# Patient Record
Sex: MALE | Race: WHITE | Employment: UNEMPLOYED | URBAN - METROPOLITAN AREA
[De-identification: names, ages, dates, MRNs, and addresses within clinical notes are randomized per-mention and may not be internally consistent; named-entity substitution may affect disease eponyms.]

---

## 2017-05-24 ENCOUNTER — OFFICE VISIT (OUTPATIENT)
Dept: FAMILY MEDICINE | Facility: CLINIC | Age: 22
End: 2017-05-24
Payer: COMMERCIAL

## 2017-05-24 VITALS
OXYGEN SATURATION: 98 % | DIASTOLIC BLOOD PRESSURE: 70 MMHG | TEMPERATURE: 97.5 F | SYSTOLIC BLOOD PRESSURE: 124 MMHG | HEART RATE: 82 BPM | RESPIRATION RATE: 19 BRPM | WEIGHT: 151 LBS | HEIGHT: 71 IN | BODY MASS INDEX: 21.14 KG/M2

## 2017-05-24 DIAGNOSIS — Z01.818 PREOP GENERAL PHYSICAL EXAM: Primary | ICD-10-CM

## 2017-05-24 LAB
ERYTHROCYTE [DISTWIDTH] IN BLOOD BY AUTOMATED COUNT: 12.3 % (ref 10–15)
HCT VFR BLD AUTO: 44.7 % (ref 40–53)
HGB BLD-MCNC: 15.3 G/DL (ref 13.3–17.7)
MCH RBC QN AUTO: 29.8 PG (ref 26.5–33)
MCHC RBC AUTO-ENTMCNC: 34.2 G/DL (ref 31.5–36.5)
MCV RBC AUTO: 87 FL (ref 78–100)
PLATELET # BLD AUTO: 222 10E9/L (ref 150–450)
RBC # BLD AUTO: 5.13 10E12/L (ref 4.4–5.9)
WBC # BLD AUTO: 7.4 10E9/L (ref 4–11)

## 2017-05-24 PROCEDURE — 99214 OFFICE O/P EST MOD 30 MIN: CPT | Performed by: FAMILY MEDICINE

## 2017-05-24 PROCEDURE — 85027 COMPLETE CBC AUTOMATED: CPT | Performed by: FAMILY MEDICINE

## 2017-05-24 PROCEDURE — 36415 COLL VENOUS BLD VENIPUNCTURE: CPT | Performed by: FAMILY MEDICINE

## 2017-05-24 NOTE — NURSING NOTE
"Chief Complaint   Patient presents with     Pre-Op Exam       Initial /70 (BP Location: Right arm, Patient Position: Chair, Cuff Size: Adult Large)  Pulse 82  Temp 97.5  F (36.4  C) (Oral)  Resp 19  Ht 5' 11\" (1.803 m)  Wt 151 lb (68.5 kg)  SpO2 98%  BMI 21.06 kg/m2 Estimated body mass index is 21.06 kg/(m^2) as calculated from the following:    Height as of this encounter: 5' 11\" (1.803 m).    Weight as of this encounter: 151 lb (68.5 kg).    Medication Reconciliation: complete    Mariah Jacobsen [facility]      Health Maintenance- Has Been Reviewed.                "

## 2017-05-24 NOTE — MR AVS SNAPSHOT
After Visit Summary   5/24/2017    Josh Dawson    MRN: [id number]           Patient Information     Date Of Birth          1995        Visit Information        Provider Department      5/24/2017 9:00 AM Sylvester Larsen MD [facility]        Today's Diagnoses     Preop general physical exam    -  1      Care Instructions      Before Your Surgery      Call your surgeon if there is any change in your health. This includes signs of a cold or flu (such as a sore throat, runny nose, cough, rash or fever).    Do not smoke, drink alcohol or take over the counter medicine (unless your surgeon or primary care doctor tells you to) for the 24 hours before and after surgery.    If you take prescribed drugs: Follow your doctor s orders about which medicines to take and which to stop until after surgery.    Eating and drinking prior to surgery: follow the instructions from your surgeon    Take a shower or bath the night before surgery. Use the soap your surgeon gave you to gently clean your skin. If you do not have soap from your surgeon, use your regular soap. Do not shave or scrub the surgery site.  Wear clean pajamas and have clean sheets on your bed.           Follow-ups after your visit        Who to contact     If you have questions or need follow up information about today's clinic visit or your schedule please contact [facility] directly at [phone].  Normal or non-critical lab and imaging results will be communicated to you by MyChart, letter or phone within 4 business days after the clinic has received the results. If you do not hear from us within 7 days, please contact the clinic through MyChart or phone. If you have a critical or abnormal lab result, we will notify you by phone as soon as possible.  Submit refill requests through [employer] or call your pharmacy and they will forward the refill request to us. Please allow 3 business days for your refill to be  "completed.          Additional Information About Your Visit        [employer] Information     [employer] lets you send messages to your doctor, view your test results, renew your prescriptions, schedule appointments and more. To sign up, go to [email or web address] . Click on \"Log in\" on the left side of the screen, which will take you to the Welcome page. Then click on \"Sign up Now\" on the right side of the page.     You will be asked to enter the access code listed below, as well as some personal information. Please follow the directions to create your username and password.     Your access code is: [email or web address]  Expires: 2017  9:23 AM     Your access code will  in 90 days. If you need help or a new code, please call your [facility] clinic or [phone].        Care EveryWhere ID     This is your Care EveryWhere ID. This could be used by other organizations to access your [facility] medical records  [id number]        Your Vitals Were     Pulse Temperature Respirations Height Pulse Oximetry BMI (Body Mass Index)    82 97.5  F (36.4  C) (Oral) 19 5' 11\" (1.803 m) 98% 21.06 kg/m2       Blood Pressure from Last 3 Encounters:   17 124/70   06/19/15 124/82   13 114/70    Weight from Last 3 Encounters:   17 151 lb (68.5 kg)   06/19/15 152 lb 3.2 oz (69 kg) (56 %)*   13 145 lb (65.8 kg) (59 %)*     * Growth percentiles are based on CDC 2-20 Years data.              We Performed the Following     CBC with platelets        Primary Care Provider    None Specified       No primary provider on file.        Thank you!     Thank you for choosing [facility]  for your care. Our goal is always to provide you with excellent care. Hearing back from our patients is one way we can continue to improve our services. Please take a few minutes to complete the written survey that you may receive in the mail after your visit with us. Thank you!             Your Updated Medication List - Protect " others around you: Learn how to safely use, store and throw away your medicines at www.disposemymeds.org.          This list is accurate as of: 5/24/17  9:23 AM.  Always use your most recent med list.                   Brand Name Dispense Instructions for use    NO ACTIVE MEDICATIONS      .

## 2017-05-24 NOTE — PROGRESS NOTES
[facility]  [id number] [place] [id number]  [phone]  Dept: [phone]    PRE-OP EVALUATION:  Today's date: 2017    Josh Dawson (: 1995) presents for pre-operative evaluation assessment as requested by Dr. Najera .  He requires evaluation and anesthesia risk assessment prior to undergoing surgery/procedure for treatment of Removal of right distal femur plate .     Date of Surgery/ Procedure: 17  Time of Surgery/ Procedure: 10am  Hospital/Surgical Facility: [facility]   Fax number for surgical facility: [phone]  Primary Physician: No primary care provider on file.  Type of Anesthesia Anticipated: General    Patient has a Health Care Directive or Living Will:  YES     1. NO - Do you have a history of heart attack, stroke, stent, bypass or surgery on an artery in the head, neck, heart or legs?  2. NO - Do you ever have any pain or discomfort in your chest?  3. NO - Do you have a history of  Heart Failure?  4. NO - Are you troubled by shortness of breath when: walking on the level, up a slight hill or at night?  5. NO - Do you currently have a cold, bronchitis or other respiratory infection?  6. NO - Do you have a cough, shortness of breath or wheezing?  7. NO - Do you sometimes get pains in the calves of your legs when you walk?  8. NO - Do you or anyone in your family have previous history of blood clots?  9. NO - Do you or does anyone in your family have a serious bleeding problem such as prolonged bleeding following surgeries or cuts?  10. NO - Have you ever had problems with anemia or been told to take iron pills?  11. NO - Have you had any abnormal blood loss such as black, tarry or bloody stools, or abnormal vaginal bleeding?  12. NO - Have you ever had a blood transfusion?  13. NO - Have you or any of your relatives ever had problems with anesthesia?  14. NO - Do you have sleep apnea, excessive snoring or daytime drowsiness?  15. NO - Do you  "have any prosthetic heart valves?  16. NO - Do you have prosthetic joints?  17. NO - Is there any chance that you may be pregnant?    HPI:                                                      Brief HPI related to upcoming procedure: Patient undergoing removal of hardware from previous femur fracture status post ORIF.    Otherwise has been healthy.    MEDICAL HISTORY:                                                      There are no active problems to display for this patient.     Past Medical History:   Diagnosis Date     Femur fracture, right (H) 9/14/2013     Past Surgical History:   Procedure Laterality Date     OPEN REDUCTION INTERNAL FIXATION FEMUR DISTAL  9/14/2013    Procedure: OPEN REDUCTION INTERNAL FIXATION FEMUR DISTAL;  OPEN REDUCTION INTERNAL FIXATION FEMUR DISTAL ,right;  Surgeon: Austen Arevalo MD;  Location: [facility] OR     No current outpatient prescriptions on file.     OTC products: None, except as noted above    Allergies   Allergen Reactions     Seasonal Allergies       Latex Allergy: NO    Social History   Substance Use Topics     Smoking status: Never Smoker     Smokeless tobacco: Never Used      Comment: non smoking home     Alcohol use No     History   Drug Use No       REVIEW OF SYSTEMS:                                                    C: NEGATIVE for fever, chills, change in weight  E/M: NEGATIVE for ear, mouth and throat problems  R: NEGATIVE for significant cough or SOB  CV: NEGATIVE for chest pain, palpitations or peripheral edema    EXAM:                                                    /70 (BP Location: Right arm, Patient Position: Chair, Cuff Size: Adult Large)  Pulse 82  Temp 97.5  F (36.4  C) (Oral)  Resp 19  Ht 5' 11\" (1.803 m)  Wt 151 lb (68.5 kg)  SpO2 98%  BMI 21.06 kg/m2  GENERAL APPEARANCE: healthy, alert and no distress  HENT: ear canals and TM's normal and nose and mouth without ulcers or lesions  RESP: lungs clear to auscultation - no rales, rhonchi or " wheezes  CV: regular rate and rhythm, normal S1 S2, no S3 or S4 and no murmur, click or rub   ABDOMEN: soft, nontender, no HSM or masses and bowel sounds normal  NEURO: Normal strength and tone, sensory exam grossly normal, mentation intact and speech normal    DIAGNOSTICS:                                                      Labs Resulted Today:   Results for orders placed or performed in visit on 05/24/17   CBC with platelets   Result Value Ref Range    WBC 7.4 4.0 - 11.0 10e9/L    RBC Count 5.13 4.4 - 5.9 10e12/L    Hemoglobin 15.3 13.3 - 17.7 g/dL    Hematocrit 44.7 40.0 - 53.0 %    MCV 87 78 - 100 fl    MCH 29.8 26.5 - 33.0 pg    MCHC 34.2 31.5 - 36.5 g/dL    RDW 12.3 10.0 - 15.0 %    Platelet Count 222 150 - 450 10e9/L       Recent Labs   Lab Test  12/31/13   1221  09/15/13   0711  09/14/13   1055   HGB  14.5  10.8*  13.9   PLT  250   --   247   NA  141   --   143   POTASSIUM  4.5   --   3.6   CR  0.94   --   0.86     IMPRESSION:                                                    Reason for surgery/procedure: Hardware removal from femur  Diagnosis/reason for consult: preoperative evaluation for assessment of cardiovascular and respiratory disease as well as overall risk assessment and perioperative medical management.    The proposed surgical procedure is considered INTERMEDIATE risk.    REVISED CARDIAC RISK INDEX  The patient has the following serious cardiovascular risks for perioperative complications such as (MI, PE, VFib and 3  AV Block):  No serious cardiac risks  INTERPRETATION: 0 risks: Class I (very low risk - 0.4% complication rate)    The patient has the following additional risks for perioperative complications:  No identified additional risks      ICD-10-CM    1. Preop general physical exam Z01.818 CBC with platelets       RECOMMENDATIONS:                                                      APPROVAL GIVEN to proceed with proposed procedure, without further diagnostic evaluation.       Signed  Electronically by: Sylvester Larsen MD    Copy of this evaluation report is provided to requesting physician.    [facility] Preop Guidelines

## 2017-06-21 ENCOUNTER — TRANSFERRED RECORDS (OUTPATIENT)
Dept: HEALTH INFORMATION MANAGEMENT | Facility: CLINIC | Age: 22
End: 2017-06-21